# Patient Record
Sex: FEMALE | Race: WHITE | NOT HISPANIC OR LATINO | Employment: FULL TIME | URBAN - METROPOLITAN AREA
[De-identification: names, ages, dates, MRNs, and addresses within clinical notes are randomized per-mention and may not be internally consistent; named-entity substitution may affect disease eponyms.]

---

## 2019-11-27 ENCOUNTER — HOSPITAL ENCOUNTER (EMERGENCY)
Facility: MEDICAL CENTER | Age: 19
End: 2019-11-27
Attending: EMERGENCY MEDICINE
Payer: COMMERCIAL

## 2019-11-27 VITALS
SYSTOLIC BLOOD PRESSURE: 124 MMHG | HEIGHT: 64 IN | WEIGHT: 137.79 LBS | BODY MASS INDEX: 23.52 KG/M2 | HEART RATE: 87 BPM | OXYGEN SATURATION: 93 % | RESPIRATION RATE: 18 BRPM | DIASTOLIC BLOOD PRESSURE: 88 MMHG | TEMPERATURE: 98.6 F

## 2019-11-27 DIAGNOSIS — B34.9 VIRAL SYNDROME: ICD-10-CM

## 2019-11-27 DIAGNOSIS — R11.2 NON-INTRACTABLE VOMITING WITH NAUSEA, UNSPECIFIED VOMITING TYPE: ICD-10-CM

## 2019-11-27 PROCEDURE — 99283 EMERGENCY DEPT VISIT LOW MDM: CPT

## 2019-11-27 RX ORDER — ONDANSETRON 4 MG/1
4 TABLET, ORALLY DISINTEGRATING ORAL EVERY 8 HOURS PRN
Qty: 10 TAB | Refills: 0 | Status: SHIPPED | OUTPATIENT
Start: 2019-11-27 | End: 2021-01-05

## 2019-11-27 NOTE — ED NOTES
Patient given 1 Rx, educated on use and administration. Patient given work note from ERP. Pt given follow up instructions. Verbalized understanding. Ambulated to lobby with steady gait.

## 2019-11-27 NOTE — ED TRIAGE NOTES
Patient states she had n/v for the last two days, no longer vomiting and starting to feel better but now has congested nose.  No noted fevers.

## 2019-11-27 NOTE — ED PROVIDER NOTES
"ED Provider Note    Scribed for Volodymyr Miller M.D. by Raymon Motley. 11/27/2019  11:56 AM    Primary care provider: Pcp Pt States None  Means of arrival: Walk-In   History obtained from: Patient  History limited by: None    CHIEF COMPLAINT  Chief Complaint   Patient presents with   • N/V       HPI  Kasia Vaughan is a 19 y.o. female who presents to the Emergency Department for evaluation of several emesis episodes onset yesterday. She states that she threw up twice last night, and once this morning. She reports no alleviating or exacerbating factors. She has associated diarrhea, body aches, chills, headache, and rhinorrhea. She adds that she has not been able to keep any food or liquid down. She has not tried eating today secondary to her vomiting. Denies sore throat, coughing or chest pain.      REVIEW OF SYSTEMS  Pertinent positives include vomiting, nausea, diarrhea body aches, chills, headache, and rhinorrhea. Pertinent negatives include no sore throat, coughing, or chest pain.  All other systems reviewed and negative.    PAST MEDICAL HISTORY   Patient notes no pertinent past medical history.     SURGICAL HISTORY  patient denies any surgical history    SOCIAL HISTORY  Social History     Tobacco Use   • Smoking status: Not on file   Substance Use Topics   • Alcohol use: Not on file   • Drug use: Not on file      Social History     Substance and Sexual Activity   Drug Use Not on file       FAMILY HISTORY  No pertinent family history noted.    CURRENT MEDICATIONS  None    ALLERGIES  No Known Allergies    PHYSICAL EXAM  VITAL SIGNS: /71   Pulse (!) 103   Temp 37 °C (98.6 °F) (Temporal)   Resp 18   Ht 1.626 m (5' 4\")   Wt 62.5 kg (137 lb 12.6 oz)   LMP 11/27/2018 (Approximate)   SpO2 93%   BMI 23.65 kg/m²     Vital signs reviewed.  Constitutional:  Appears well-developed and well-nourished. No distress.    Mouth/Throat: Oropharynx is clear and moist. No exudates.   Eyes: EOM are normal. Pupils are " equal, round, and reactive to light.   Neck: Normal range of motion. Neck supple.   Cardiovascular: Normal rate, regular rhythm and normal heart sounds.    Pulmonary/Chest: Effort normal and breath sounds normal. No wheezes.   Abdominal: Soft. There is no tenderness. There is no rebound and no guarding.   Lymphadenopathy: No cervical adenopathy.   Skin: Skin is warm and dry. No rashes.     COURSE & MEDICAL DECISION MAKING  Pertinent Labs & Imaging studies reviewed. (See chart for details) The patient's [facility] and past medical records were reviewed    11:56 AM - Patient seen and examined at bedside. Patient had a normal exam with no abnormalities in the systems checked above. Discussed with the patient that the CDC does not recommend treating healthy patients for the flu if they do not have it. Discussed other medication options to help treat her symptoms, such as DayQuil and NightQuil. I informed her of my plan for discharge, which does include sending her home with Zofran to help with her nausea, as well as instructing her to return for any new or worsening symptoms. Patient understands and verbalizes agreement to plan of care. Patient is comfortable going home at this time. The differential diagnoses include but are not limited to: Viral syndrome, influenza, gastroenteritis.    The patient will return for new or worsening symptoms and is stable at the time of discharge.  Patient has a benign abdomen.  By CDC guidelines she does not meet criteria for Tamiflu therefore no diagnostic testing was performed.  Patient was given Zofran and discharged home in stable condition    The patient is referred to a primary physician for blood pressure management, diabetic screening, and for all other preventative health concerns.      DISPOSITION:  Patient will be discharged home in stable condition.    FOLLOW UP:  No follow-up provider specified.    OUTPATIENT MEDICATIONS:  New Prescriptions    ONDANSETRON (ZOFRAN ODT) 4  MG TABLET DISPERSIBLE    Take 1 Tab by mouth every 8 hours as needed for Nausea.         FINAL IMPRESSION  1. Non-intractable vomiting with nausea, unspecified vomiting type    2. Viral syndrome          Raymon MCFADDEN (Scribe), am scribing for, and in the presence of, Volodymyr Miller M.D..    Electronically signed by: Raymon Motley (Scribe), 11/27/2019    Volodymyr MCFADDEN M.D. personally performed the services described in this documentation, as scribed by Raymon Motley in my presence, and it is both accurate and complete.    C    The note accurately reflects work and decisions made by me.  Volodymyr Miller  11/27/2019  1:52 PM

## 2021-01-05 ENCOUNTER — HOSPITAL ENCOUNTER (OUTPATIENT)
Facility: MEDICAL CENTER | Age: 21
End: 2021-01-05
Attending: PHYSICIAN ASSISTANT
Payer: COMMERCIAL

## 2021-01-05 ENCOUNTER — OFFICE VISIT (OUTPATIENT)
Dept: URGENT CARE | Facility: PHYSICIAN GROUP | Age: 21
End: 2021-01-05
Payer: COMMERCIAL

## 2021-01-05 VITALS
SYSTOLIC BLOOD PRESSURE: 104 MMHG | WEIGHT: 134 LBS | BODY MASS INDEX: 22.33 KG/M2 | TEMPERATURE: 97.9 F | HEART RATE: 68 BPM | HEIGHT: 65 IN | RESPIRATION RATE: 12 BRPM | DIASTOLIC BLOOD PRESSURE: 58 MMHG | OXYGEN SATURATION: 98 %

## 2021-01-05 DIAGNOSIS — Z20.822 SUSPECTED COVID-19 VIRUS INFECTION: ICD-10-CM

## 2021-01-05 DIAGNOSIS — R11.0 NAUSEA: ICD-10-CM

## 2021-01-05 LAB
COVID ORDER STATUS COVID19: NORMAL
SARS-COV-2 RNA RESP QL NAA+PROBE: NOTDETECTED
SPECIMEN SOURCE: NORMAL

## 2021-01-05 PROCEDURE — C9803 HOPD COVID-19 SPEC COLLECT: HCPCS

## 2021-01-05 PROCEDURE — U0003 INFECTIOUS AGENT DETECTION BY NUCLEIC ACID (DNA OR RNA); SEVERE ACUTE RESPIRATORY SYNDROME CORONAVIRUS 2 (SARS-COV-2) (CORONAVIRUS DISEASE [COVID-19]), AMPLIFIED PROBE TECHNIQUE, MAKING USE OF HIGH THROUGHPUT TECHNOLOGIES AS DESCRIBED BY CMS-2020-01-R: HCPCS

## 2021-01-05 PROCEDURE — U0005 INFEC AGEN DETEC AMPLI PROBE: HCPCS

## 2021-01-05 PROCEDURE — 99204 OFFICE O/P NEW MOD 45 MIN: CPT | Mod: CS | Performed by: PHYSICIAN ASSISTANT

## 2021-01-05 RX ORDER — ONDANSETRON 4 MG/1
4 TABLET, FILM COATED ORAL EVERY 4 HOURS PRN
Qty: 20 TAB | Refills: 0 | Status: SHIPPED | OUTPATIENT
Start: 2021-01-05 | End: 2021-01-10

## 2021-01-05 ASSESSMENT — ENCOUNTER SYMPTOMS
SHORTNESS OF BREATH: 0
ABDOMINAL PAIN: 0
DIZZINESS: 0
VOMITING: 0
CHILLS: 1
MYALGIAS: 1
HEADACHES: 1
DIARRHEA: 0
SWOLLEN GLANDS: 0
CARDIOVASCULAR NEGATIVE: 1
COUGH: 0
NAUSEA: 1
WHEEZING: 0
SORE THROAT: 0
SINUS PAIN: 0
FEVER: 0
RHINORRHEA: 1

## 2021-01-05 NOTE — LETTER
January 5, 2021      Kasia Vaughan  [phone] [place] [id number]    To Whom it May Concern,       Your employee was seen in our clinic today.  A concern for COVID-19 has been identified and testing is in progress.?       We are asking you to excuse absences while following self-isolation protocol per Center for Disease Control (CDC) guidelines.  Your employee will be able to access test results through our electronic delivery system called [employer].?       If the results of testing are negative, and once there has been no fever (temperature >100.4 F) for at least 72 hours without treatment, and no vomiting or diarrhea for at least 48 hours, then return to work is approved.       If the results of testing are positive then your employee will be contacted by the [facility] or [facility] department for further instructions on duration of self-isolation and return to work protocol. In general, this will also follow the CDC guidelines with a minimum of 10 days from the onset of symptoms and without fever, vomiting, or diarrhea as above.?       In general, repeat testing is not necessary and not offered through our [facility] care.?       This is the only note that will be provided from [facility] for this visit.  Your employee will require an appointment with a primary care provider if FMLA or disability forms are required.     Sincerely,?        Bob Preston P.A.-C.    Electronically Signed

## 2021-04-19 ENCOUNTER — HOSPITAL ENCOUNTER (EMERGENCY)
Facility: MEDICAL CENTER | Age: 21
End: 2021-04-19
Attending: EMERGENCY MEDICINE
Payer: COMMERCIAL

## 2021-04-19 VITALS
TEMPERATURE: 97.5 F | DIASTOLIC BLOOD PRESSURE: 61 MMHG | RESPIRATION RATE: 18 BRPM | HEART RATE: 67 BPM | SYSTOLIC BLOOD PRESSURE: 109 MMHG | OXYGEN SATURATION: 99 % | HEIGHT: 65 IN | WEIGHT: 130.73 LBS | BODY MASS INDEX: 21.78 KG/M2

## 2021-04-19 DIAGNOSIS — Z76.0 MEDICATION REFILL: ICD-10-CM

## 2021-04-19 PROCEDURE — 99282 EMERGENCY DEPT VISIT SF MDM: CPT

## 2021-04-19 RX ORDER — ESCITALOPRAM OXALATE 10 MG/1
10 TABLET ORAL DAILY
COMMUNITY
End: 2021-04-19 | Stop reason: SDUPTHER

## 2021-04-19 RX ORDER — ESCITALOPRAM OXALATE 10 MG/1
10 TABLET ORAL DAILY
Qty: 30 TABLET | Refills: 2 | Status: SHIPPED | OUTPATIENT
Start: 2021-04-19 | End: 2021-07-18

## 2021-04-20 NOTE — ED TRIAGE NOTES
"Kasia Vaughan   20 y.o. female   Patient presents with:  Chief Complaint   Patient presents with   • Medication Refill     Pt ambulated steady gait to triage for above complaint.     Patient was seen at [facility] and prescribed lexapro 10mg.  Patient has run out of medication and requesting coverage till May 25th when she sees her psychologist.      Pt is alert and oriented, speaking in full sentences, follows commands and responds appropriately to questions. NAD. Resp are even and unlabored.     Pt placed in waiting room. Pt educated on triage process. Pt encouraged to alert staff for any changes.    Patient and staff wearing appropriate PPE    /66   Pulse 75   Temp 36.5 °C (97.7 °F) (Oral)   Resp 16   Ht 1.651 m (5' 5\")   Wt 59.3 kg (130 lb 11.7 oz)   SpO2 98%   BMI 21.76 kg/m²     "

## 2021-04-20 NOTE — ED PROVIDER NOTES
"ED Provider Note    CHIEF COMPLAINT  Chief Complaint   Patient presents with   • Medication Refill     Seen at 6:16 PM    HPI  Kasia Vaughan is a 20 y.o. female who presents for medication of her Lexapro.  The patient admitted herself to [facility] last month, she was started on Lexapro 10 mg daily and has felt significant improvement in her symptoms.  Her depression appears well controlled.  She denies any suicidal or homicidal ideation.  She is going to run out of the medications this week and her next visit is not until next week.  She would like to avoid withdrawals if possible.    REVIEW OF SYSTEMS  See HPI  PAST MEDICAL HISTORY   has a past medical history of Psychiatric disorder.    SOCIAL HISTORY  Social History     Tobacco Use   • Smoking status: Never Smoker   • Smokeless tobacco: Never Used   Substance and Sexual Activity   • Alcohol use: Never   • Drug use: Never   • Sexual activity: Not on file       SURGICAL HISTORY  patient denies any surgical history    CURRENT MEDICATIONS  Reviewed.  See Encounter Summary.     ALLERGIES  No Known Allergies    PHYSICAL EXAM  VITAL SIGNS: /61   Pulse 67   Temp 36.4 °C (97.5 °F)   Resp 18   Ht 1.651 m (5' 5\")   Wt 59.3 kg (130 lb 11.7 oz)   LMP  (LMP Unknown)   SpO2 99%   BMI 21.76 kg/m²   Constitutional: Awake, alert in no apparent distress.  HENT: Normocephalic, Bilateral external ears normal. Nose normal.   Eyes: Conjunctiva normal, non-icteric, EOMI.    Thorax & Lungs: Easy unlabored respirations  Cardiovascular:    Abdomen:  No distention  Skin: Visualized skin is  Dry, No erythema, No rash.   Extremities:   atraumatic   Neurologic: Alert, Grossly non-focal.   Psychiatric: Affect and Mood normal    RADIOLOGY  No orders to display       Nursing notes and vital signs were reviewed. (See chart for details)    Decision Making:  This is a pleasant 20 y.o. year old female who presents with a request for medication refill.  The patient does not have " any side effects, she has no suicidal or homicidal ideation.  She has tolerated the Lexapro quite well.  A 3-month supply will be sent to her pharmacy.    DISPOSITION:  Patient will be discharged home in good condition.    Discharge Medications:  Discharge Medication List as of 4/19/2021  6:47 PM          The patient was discharged home (see d/c instructions) and told to return immediately for any signs or symptoms listed, or any worsening at all.  The patient verbally agreed to the discharge precautions and follow-up plan which is documented in EPIC.        FINAL IMPRESSION  1. Medication refill

## 2023-03-11 ENCOUNTER — OFFICE VISIT (OUTPATIENT)
Dept: URGENT CARE | Facility: PHYSICIAN GROUP | Age: 23
End: 2023-03-11
Payer: COMMERCIAL

## 2023-03-11 VITALS
HEART RATE: 84 BPM | TEMPERATURE: 98.3 F | WEIGHT: 145 LBS | SYSTOLIC BLOOD PRESSURE: 120 MMHG | HEIGHT: 64 IN | OXYGEN SATURATION: 98 % | RESPIRATION RATE: 16 BRPM | DIASTOLIC BLOOD PRESSURE: 74 MMHG | BODY MASS INDEX: 24.75 KG/M2

## 2023-03-11 DIAGNOSIS — U07.1 COVID-19: ICD-10-CM

## 2023-03-11 PROCEDURE — 99213 OFFICE O/P EST LOW 20 MIN: CPT | Performed by: FAMILY MEDICINE

## 2023-03-11 NOTE — LETTER
March 11, 2023         Patient: Kasia Vaughan   YOB: 2000   Date of Visit: 3/11/2023           To Whom it May Concern:    Kasia Vaughan was seen in my clinic on 3/11/2023. Please excuse work absences due to COVID-19. She may return Wednesday 3/15/2023 if symptoms are improving and no fever >100.4F without medications for 24 hours.     Sincerely,           Parminder Manuel M.D.  Electronically Signed

## 2023-03-12 NOTE — PROGRESS NOTES
"Subjective     Kasia Vaughan is a 22 y.o. female who presents with Other (COVID +, pt wants to know how to proceed)            2 days nasal congestion. Fever tmax 102F. Responded to Tylenol. PMH suspected C19 2021. Mild cough.  No shortness of breath or wheezing.  No PMH asthma or pneumonia.  Minimal relief with OTC medication.  Home COVID-19 positive.  No other aggravating alleviating factors.      Review of Systems   Constitutional:  Negative for malaise/fatigue and weight loss.   Eyes:  Negative for discharge and redness.   Gastrointestinal:  Negative for nausea and vomiting.   Musculoskeletal:  Negative for joint pain and myalgias.   Skin:  Negative for itching and rash.            Objective     /74   Pulse 84   Temp 36.8 °C (98.3 °F)   Resp 16   Ht 1.626 m (5' 4\")   Wt 65.8 kg (145 lb)   SpO2 98%   BMI 24.89 kg/m²      Physical Exam  Constitutional:       General: She is not in acute distress.     Appearance: She is well-developed.   HENT:      Head: Normocephalic and atraumatic.      Right Ear: Tympanic membrane normal.      Left Ear: Tympanic membrane normal.      Nose: Congestion present.      Mouth/Throat:      Mouth: Mucous membranes are moist.      Pharynx: No posterior oropharyngeal erythema.   Eyes:      Conjunctiva/sclera: Conjunctivae normal.   Cardiovascular:      Rate and Rhythm: Normal rate and regular rhythm.      Heart sounds: Normal heart sounds. No murmur heard.  Pulmonary:      Effort: Pulmonary effort is normal.      Breath sounds: Normal breath sounds. No wheezing.   Skin:     General: Skin is warm and dry.      Findings: No rash.   Neurological:      Mental Status: She is alert.                           Assessment & Plan         1. COVID-19          Differential diagnosis, natural history, supportive care, and indications for immediate follow-up were discussed.     Self isolate per CDC guidelines.  Work note given.        "

## 2023-03-15 ASSESSMENT — ENCOUNTER SYMPTOMS
MYALGIAS: 0
EYE DISCHARGE: 0
EYE REDNESS: 0
WEIGHT LOSS: 0
NAUSEA: 0
VOMITING: 0

## 2024-08-29 ENCOUNTER — HOSPITAL ENCOUNTER (OUTPATIENT)
Facility: MEDICAL CENTER | Age: 24
End: 2024-08-29
Payer: COMMERCIAL

## 2024-09-08 LAB
C TRACH RRNA CVX QL NAA+PROBE: NEGATIVE
COMMENT NL11729A: NORMAL
CYTOLOGIST CVX/VAG CYTO: NORMAL
CYTOLOGY CVX/VAG DOC CYTO: NORMAL
CYTOLOGY CVX/VAG DOC THIN PREP: NORMAL
N GONORRHOEA RRNA CVX QL NAA+PROBE: NEGATIVE
NOTE NL11727A: NORMAL
OTHER STN SPEC: NORMAL
STAT OF ADQ CVX/VAG CYTO-IMP: NORMAL